# Patient Record
Sex: FEMALE | Race: WHITE | NOT HISPANIC OR LATINO
[De-identification: names, ages, dates, MRNs, and addresses within clinical notes are randomized per-mention and may not be internally consistent; named-entity substitution may affect disease eponyms.]

---

## 2022-06-29 ENCOUNTER — NON-APPOINTMENT (OUTPATIENT)
Age: 82
End: 2022-06-29

## 2022-06-30 ENCOUNTER — TRANSCRIPTION ENCOUNTER (OUTPATIENT)
Age: 82
End: 2022-06-30

## 2022-07-02 ENCOUNTER — OUTPATIENT (OUTPATIENT)
Dept: OUTPATIENT SERVICES | Facility: HOSPITAL | Age: 82
LOS: 1 days | End: 2022-07-02

## 2022-07-02 ENCOUNTER — APPOINTMENT (OUTPATIENT)
Dept: DISASTER EMERGENCY | Facility: HOSPITAL | Age: 82
End: 2022-07-02

## 2022-07-02 VITALS
RESPIRATION RATE: 18 BRPM | HEART RATE: 66 BPM | TEMPERATURE: 97 F | OXYGEN SATURATION: 96 % | SYSTOLIC BLOOD PRESSURE: 106 MMHG | DIASTOLIC BLOOD PRESSURE: 58 MMHG

## 2022-07-02 VITALS
RESPIRATION RATE: 18 BRPM | HEIGHT: 64 IN | OXYGEN SATURATION: 97 % | TEMPERATURE: 97 F | SYSTOLIC BLOOD PRESSURE: 111 MMHG | DIASTOLIC BLOOD PRESSURE: 65 MMHG | HEART RATE: 65 BPM | WEIGHT: 151.9 LBS

## 2022-07-02 DIAGNOSIS — U07.1 COVID-19: ICD-10-CM

## 2022-07-02 RX ORDER — BEBTELOVIMAB 87.5 MG/ML
175 INJECTION, SOLUTION INTRAVENOUS ONCE
Refills: 0 | Status: COMPLETED | OUTPATIENT
Start: 2022-07-02 | End: 2022-07-02

## 2022-07-02 RX ADMIN — BEBTELOVIMAB 175 MILLIGRAM(S): 87.5 INJECTION, SOLUTION INTRAVENOUS at 15:01

## 2022-07-02 NOTE — CHART NOTE - NSCHARTNOTEFT_GEN_A_CORE
CC: Monoclonal Antibody Infusion/COVID 19 Positive  81y Female with PMH of Depression, Hypotension and recent dx of COVID 19+ who presents today for elective Bebtelovimab. Patient has been screened and was deemed to be a candidate.    Symptoms/ Criteria  Date of Symptom Onset: 6/28/2022  Symptoms: cough, fatigue  Date of Positive COVID PCR: 6/30/2022  Risk Profile includes: Mental health disorder, Use of anti depression medications and risk of drug drug interaction with Paxlovid, moderate COVID symptoms,       Vaccination Status:     PMHx:  Infection due to severe acute respiratory syndrome coronavirus 2 (SARS-CoV-2)        Exam/findings:  T(C): 36.4 (07-02-22 @ 15:20), Max: 36.4 (07-02-22 @ 15:20)  HR: 62 (07-02-22 @ 15:20) (62 - 65)  BP: 94/58 (07-02-22 @ 15:20) (94/58 - 111/65)  RR: 18 (07-02-22 @ 15:20) (18 - 18)  SpO2: 98% (07-02-22 @ 15:20) (97% - 98%)    PE:   Appearance: NAD	  HEENT:  NC/AT  Cardiovascular:  No edema  Respiratory: no use of accessory muscles  Gastrointestinal:  non-distended   Skin: warm and dry  Neurologic: Non-focal  Extremities: Normal range of motion    ASSESSMENT:  Pt is COVID positive with mild to moderate symptoms who was referred for elective MAB (Bebtelovimab).    PLAN:  - MAB treatment explained to patient. I have reviewed the Bebtelovimab Emergency Use Authorization (EUA) and I have provided the patient or patient's caregiver with the following information:   1. FDA has authorized emergency use of Bebtelovimab to be administered for the treatment of mild to moderate COVID-19, which is not an FDA-approved biological product.   2. The patient or patient's caregiver has the option to accept or refuse administration of MAB.   3. The significant known and potential risks and benefits of Bebtelovimab and the extent to which such risks and benefits are unknown.  4. Information on available alternative treatments and risks and benefits of those alternatives.  - Patient verbalized understanding of plan and agrees to treatment. All questions answered.  - Consent for MAB obtained.   - 175mg of Bebtelovimab administered as a single intravenous injection over at least 30 seconds.   - Observe patient for one hour post medication administration and then if stable, discharge home with oupatient follow up as planned by PCP.      POST ASSESSMENT:   Patient completed MAB, and monitored x 1 hour post-infusion with no adverse reactions noted, remained hemodynamically stable.  - Patient tolerated infusion well; denies complaints of chest pain/SOB/dizziness/palpitations.   - VSS for discharge home.  - D/C instructions given/ fact sheet included.  - Patient was instructed to self-isolate and use infection control measures (e.g wear mask, isolate, social distance, avoid sharing personal items, clean and disinfect "high touch" surfaces, and frequent handwashing according to the CDC guidelines.   - The patient was informed on what symptoms to be aware of for the next couple of days, and if there are any issues to call the 24/7 clinical call center. Patient was instructed to follow up with primary care provider as needed.    DISCHARGE stable

## 2022-07-10 ENCOUNTER — TRANSCRIPTION ENCOUNTER (OUTPATIENT)
Age: 82
End: 2022-07-10

## 2022-07-10 ENCOUNTER — EMERGENCY (EMERGENCY)
Facility: HOSPITAL | Age: 82
LOS: 1 days | Discharge: LEFT BEFORE DISCHARGE | End: 2022-07-10
Attending: EMERGENCY MEDICINE

## 2022-07-10 ENCOUNTER — NON-APPOINTMENT (OUTPATIENT)
Age: 82
End: 2022-07-10

## 2022-07-10 VITALS
DIASTOLIC BLOOD PRESSURE: 67 MMHG | TEMPERATURE: 98 F | RESPIRATION RATE: 18 BRPM | SYSTOLIC BLOOD PRESSURE: 121 MMHG | WEIGHT: 151.9 LBS | OXYGEN SATURATION: 100 % | HEIGHT: 64 IN | HEART RATE: 77 BPM

## 2022-07-10 PROCEDURE — L9991: CPT

## 2022-07-10 NOTE — ED ADULT TRIAGE NOTE - CHIEF COMPLAINT QUOTE
S/P TRIPPED /FELL AT HOME DENIES LOC C/O LACERATION IN HER HEAD /PT A/O X 3 NOT IN DISTRESS /COVID +6/30/2022

## 2022-07-11 NOTE — ED ADULT NURSE NOTE - OBJECTIVE STATEMENT
Pt presents to the ED with c/o laceration on head s/p trip and fall at home. Denies LOC. Pt was also COVID+ on 6/30/2022.

## 2022-07-11 NOTE — ED ADULT NURSE NOTE - PAIN RATING/NUMBER SCALE (0-10): ACTIVITY
Detail Level: Simple
Instructions: This plan will send the code FBSD to the PM system.  DO NOT or CHANGE the price.
Price (Do Not Change): 0.00
7

## 2022-07-17 ENCOUNTER — INPATIENT (INPATIENT)
Facility: HOSPITAL | Age: 82
LOS: 0 days | Discharge: HOME | DRG: 563 | End: 2022-07-18
Attending: SPECIALIST | Admitting: SPECIALIST
Payer: MEDICARE

## 2022-07-17 ENCOUNTER — TRANSCRIPTION ENCOUNTER (OUTPATIENT)
Age: 82
End: 2022-07-17

## 2022-07-17 VITALS
OXYGEN SATURATION: 96 % | HEIGHT: 64 IN | TEMPERATURE: 98 F | RESPIRATION RATE: 18 BRPM | HEART RATE: 62 BPM | DIASTOLIC BLOOD PRESSURE: 64 MMHG | SYSTOLIC BLOOD PRESSURE: 100 MMHG | WEIGHT: 153 LBS

## 2022-07-17 LAB
ALBUMIN SERPL ELPH-MCNC: 4.2 G/DL — SIGNIFICANT CHANGE UP (ref 3.3–5)
ALP SERPL-CCNC: 100 U/L — SIGNIFICANT CHANGE UP (ref 40–120)
ALT FLD-CCNC: 14 U/L — SIGNIFICANT CHANGE UP (ref 10–45)
ANION GAP SERPL CALC-SCNC: 10 MMOL/L — SIGNIFICANT CHANGE UP (ref 5–17)
APTT BLD: 29.8 SEC — SIGNIFICANT CHANGE UP (ref 27.5–35.5)
AST SERPL-CCNC: 18 U/L — SIGNIFICANT CHANGE UP (ref 10–40)
BASOPHILS # BLD AUTO: 0.02 K/UL — SIGNIFICANT CHANGE UP (ref 0–0.2)
BASOPHILS NFR BLD AUTO: 0.2 % — SIGNIFICANT CHANGE UP (ref 0–2)
BILIRUB SERPL-MCNC: 0.4 MG/DL — SIGNIFICANT CHANGE UP (ref 0.2–1.2)
BLD GP AB SCN SERPL QL: NEGATIVE — SIGNIFICANT CHANGE UP
BUN SERPL-MCNC: 22 MG/DL — SIGNIFICANT CHANGE UP (ref 7–23)
CALCIUM SERPL-MCNC: 9.6 MG/DL — SIGNIFICANT CHANGE UP (ref 8.4–10.5)
CHLORIDE SERPL-SCNC: 96 MMOL/L — SIGNIFICANT CHANGE UP (ref 96–108)
CO2 SERPL-SCNC: 25 MMOL/L — SIGNIFICANT CHANGE UP (ref 22–31)
CREAT SERPL-MCNC: 0.64 MG/DL — SIGNIFICANT CHANGE UP (ref 0.5–1.3)
EGFR: 90 ML/MIN/1.73M2 — SIGNIFICANT CHANGE UP
EOSINOPHIL # BLD AUTO: 0.02 K/UL — SIGNIFICANT CHANGE UP (ref 0–0.5)
EOSINOPHIL NFR BLD AUTO: 0.2 % — SIGNIFICANT CHANGE UP (ref 0–6)
GLUCOSE SERPL-MCNC: 116 MG/DL — HIGH (ref 70–99)
HCT VFR BLD CALC: 37 % — SIGNIFICANT CHANGE UP (ref 34.5–45)
HGB BLD-MCNC: 11.9 G/DL — SIGNIFICANT CHANGE UP (ref 11.5–15.5)
IMM GRANULOCYTES NFR BLD AUTO: 0.4 % — SIGNIFICANT CHANGE UP (ref 0–1.5)
INR BLD: 1.14 RATIO — SIGNIFICANT CHANGE UP (ref 0.88–1.16)
LYMPHOCYTES # BLD AUTO: 0.91 K/UL — LOW (ref 1–3.3)
LYMPHOCYTES # BLD AUTO: 7.4 % — LOW (ref 13–44)
MCHC RBC-ENTMCNC: 30.8 PG — SIGNIFICANT CHANGE UP (ref 27–34)
MCHC RBC-ENTMCNC: 32.2 GM/DL — SIGNIFICANT CHANGE UP (ref 32–36)
MCV RBC AUTO: 95.9 FL — SIGNIFICANT CHANGE UP (ref 80–100)
MONOCYTES # BLD AUTO: 0.65 K/UL — SIGNIFICANT CHANGE UP (ref 0–0.9)
MONOCYTES NFR BLD AUTO: 5.3 % — SIGNIFICANT CHANGE UP (ref 2–14)
NEUTROPHILS # BLD AUTO: 10.6 K/UL — HIGH (ref 1.8–7.4)
NEUTROPHILS NFR BLD AUTO: 86.5 % — HIGH (ref 43–77)
NRBC # BLD: 0 /100 WBCS — SIGNIFICANT CHANGE UP (ref 0–0)
PLATELET # BLD AUTO: 247 K/UL — SIGNIFICANT CHANGE UP (ref 150–400)
POTASSIUM SERPL-MCNC: 4.5 MMOL/L — SIGNIFICANT CHANGE UP (ref 3.5–5.3)
POTASSIUM SERPL-SCNC: 4.5 MMOL/L — SIGNIFICANT CHANGE UP (ref 3.5–5.3)
PROT SERPL-MCNC: 8.1 G/DL — SIGNIFICANT CHANGE UP (ref 6–8.3)
PROTHROM AB SERPL-ACNC: 13.2 SEC — SIGNIFICANT CHANGE UP (ref 10.5–13.4)
RBC # BLD: 3.86 M/UL — SIGNIFICANT CHANGE UP (ref 3.8–5.2)
RBC # FLD: 13.5 % — SIGNIFICANT CHANGE UP (ref 10.3–14.5)
RH IG SCN BLD-IMP: POSITIVE — SIGNIFICANT CHANGE UP
SODIUM SERPL-SCNC: 131 MMOL/L — LOW (ref 135–145)
WBC # BLD: 12.25 K/UL — HIGH (ref 3.8–10.5)
WBC # FLD AUTO: 12.25 K/UL — HIGH (ref 3.8–10.5)

## 2022-07-17 PROCEDURE — 73110 X-RAY EXAM OF WRIST: CPT | Mod: 26,LT,77

## 2022-07-17 PROCEDURE — 93010 ELECTROCARDIOGRAM REPORT: CPT

## 2022-07-17 PROCEDURE — 73080 X-RAY EXAM OF ELBOW: CPT | Mod: 26,LT

## 2022-07-17 PROCEDURE — 73060 X-RAY EXAM OF HUMERUS: CPT | Mod: 26,LT

## 2022-07-17 PROCEDURE — 99291 CRITICAL CARE FIRST HOUR: CPT | Mod: 25

## 2022-07-17 PROCEDURE — 73110 X-RAY EXAM OF WRIST: CPT | Mod: 26,LT

## 2022-07-17 PROCEDURE — 73090 X-RAY EXAM OF FOREARM: CPT | Mod: 26,LT

## 2022-07-17 PROCEDURE — 71045 X-RAY EXAM CHEST 1 VIEW: CPT | Mod: 26

## 2022-07-17 RX ORDER — TETANUS TOXOID, REDUCED DIPHTHERIA TOXOID AND ACELLULAR PERTUSSIS VACCINE, ADSORBED 5; 2.5; 8; 8; 2.5 [IU]/.5ML; [IU]/.5ML; UG/.5ML; UG/.5ML; UG/.5ML
0.5 SUSPENSION INTRAMUSCULAR ONCE
Refills: 0 | Status: COMPLETED | OUTPATIENT
Start: 2022-07-17 | End: 2022-07-17

## 2022-07-17 RX ORDER — CEFAZOLIN SODIUM 1 G
2000 VIAL (EA) INJECTION ONCE
Refills: 0 | Status: COMPLETED | OUTPATIENT
Start: 2022-07-17 | End: 2022-07-17

## 2022-07-17 RX ORDER — FENTANYL CITRATE 50 UG/ML
25 INJECTION INTRAVENOUS ONCE
Refills: 0 | Status: DISCONTINUED | OUTPATIENT
Start: 2022-07-17 | End: 2022-07-17

## 2022-07-17 RX ADMIN — Medication 100 MILLIGRAM(S): at 21:47

## 2022-07-17 RX ADMIN — FENTANYL CITRATE 25 MICROGRAM(S): 50 INJECTION INTRAVENOUS at 21:47

## 2022-07-17 RX ADMIN — TETANUS TOXOID, REDUCED DIPHTHERIA TOXOID AND ACELLULAR PERTUSSIS VACCINE, ADSORBED 0.5 MILLILITER(S): 5; 2.5; 8; 8; 2.5 SUSPENSION INTRAMUSCULAR at 21:48

## 2022-07-17 NOTE — ED PROVIDER NOTE - CLINICAL SUMMARY MEDICAL DECISION MAKING FREE TEXT BOX
Joel Maynard (MD): 81y F w/ PMHx of Hypotension (on midodrine) presents w/ slip and fall on ramp landing on L wrist as FOOSH. Denies head injury, LOC, neck pain, CP, abd pain, N/V. On exam, pt w/ punctate skin opening over dorsal aspect of distal radius, deformity of distal forearm but pt is neurovascularly intact distally. Concern for open fracture of distal radius. Will obtain imaging. Will provide ABx. Likely Gustilo type 1. Will give pt tetanus and pain control. Call XR to expedite imaging. Joel Maynard (MD): 81y F w/ PMHx of Hypotension (on midodrine) presents w/ slip and fall on ramp landing on L wrist as FOOSH. Denies head injury, LOC, neck pain, CP, abd pain, N/V. On exam, pt w/ punctate skin opening over dorsal aspect of distal radius, deformity of distal forearm but pt is neurovascularly intact distally. Concern for open fracture of distal radius. Will obtain imaging. Will provide ABx. Likely Gustilo type 1. Will give pt tetanus and pain control. Called XR to expedite imaging.

## 2022-07-17 NOTE — ED ADULT TRIAGE NOTE - CHIEF COMPLAINT QUOTE
Reports lost balance, slipped and fell. C/o L wrist pain with obvious deformity and open wound at site. Pt took tylenol and tramadol ~1830

## 2022-07-17 NOTE — ED PROVIDER NOTE - CARE PLAN
1 Principal Discharge DX:	Open fracture of left distal radius  Secondary Diagnosis:	Fall from standing

## 2022-07-17 NOTE — ED PROVIDER NOTE - NS_ ATTENDINGSCRIBEDETAILS _ED_A_ED_FT
The history, relevant review of symptoms, past medical and surgical history, physical examination, and medical decision making was documented by the scribe in my presence and I attest to the accuracy of the documentation.

## 2022-07-17 NOTE — ED ADULT NURSE NOTE - OBJECTIVE STATEMENT
82yo F pmh hypotension and depression presents to ED from daughter's home with daughter at the bedside following a single witnessed mechanical fall at home. Pt reports she slipped while walking with a cane instead of her walker, fell on her L wrist. Pt denies head strike, LOC, or use of blood thinners. Pt denies dizziness prior to fall. Pt is currently endorsing pain and swelling to the L wrist. Pt denies any headache, dizziness, CP, SOB, or numbness/tingling to the affected extremity. On assessment, pt is awake, a&ox4, spontaneous respirations, obvious deformity noted to L wrist with extensive swelling and open wound noted to site with small amount of active bleeding, limited ROM noted to L wrist and fingers d/t pain, tenderness to palpation of L wrist, + peripheral pulses b/l. pending MD assessmnent.

## 2022-07-17 NOTE — ED PROVIDER NOTE - ATTENDING CONTRIBUTION TO CARE
Joel Maynard MD:   I personally saw the patient and performed a substantive portion of the visit including all aspects of the medical decision making.    See MDM Joel Maynard MD:   I personally saw the patient and performed a substantive portion of the visit including all aspects of the medical decision making.    See [facility]    Critical Care Billing: Open Fracture   Upon my evaluation, this patient had a high probability of imminent or life-threatening deterioration, which required my direct attention, intervention, and personal management.  The patient has a  medical condition that impairs one or more vital organ systems.  Frequent personal assessment and adjustment of medical interventions was performed.      I have personally provided 35 minutes of critical care time exclusive of time spent on separately billable procedures. Time includes review of laboratory data, radiology results, discussion with consultants, patient and family; monitoring for potential decompensation, as well as time spent retrieving data and reviewing the chart and documenting the visit. Interventions were performed as documented above.

## 2022-07-17 NOTE — ED PROVIDER NOTE - PROGRESS NOTE DETAILS
Jolie Castro MD PGY2: Ortho consulted. Patient with left colle's fracture. Joel Maynard MD: pt reduced and splinted by Ortho, post-reduction XR performed

## 2022-07-17 NOTE — ED PROVIDER NOTE - PHYSICAL EXAMINATION
GENERAL: no acute distress, non-toxic appearing  HEAD: normocephalic, atraumatic  HEENT: PERRLA, EOMI  CARDIAC: regular rate and rhythm  PULM: clear to ascultation bilaterally  GI: abdomen nondistended, soft, nontender  NEURO: alert and oriented x 3, normal speech, no sensory deficits, moving all digits and extremities spontaneously  MSK: left wrist swelling, open injury to left wrist with active bleeding, pulses 2+ bilaterally, ROM arm and hand limited due to pain  SKIN: no visible rashes, dry, well-perfused

## 2022-07-17 NOTE — ED PROVIDER NOTE - OBJECTIVE STATEMENT
Patient is a 81y F PMHx hypotension on midodrine presenting with wrist pain after fall. Patient states she tripped down a ramp onto outstretched left arm a couple hours ago. Took tramadol for pain, minimal improvement. Severe wrist pain, denies numbness, head trauma, LOC, CP, SOB, palpitations, neck pain, back pain, hip pain.

## 2022-07-17 NOTE — ED ADULT NURSE NOTE - NSIMPLEMENTINTERV_GEN_ALL_ED
Implemented All Fall Risk Interventions:  [place] to call system. Call bell, personal items and telephone within reach. Instruct patient to call for assistance. Room bathroom lighting operational. Non-slip footwear when patient is off stretcher. Physically safe environment: no spills, clutter or unnecessary equipment. Stretcher in lowest position, wheels locked, appropriate side rails in place. Provide visual cue, wrist band, yellow gown, etc. Monitor gait and stability. Monitor for mental status changes and reorient to person, place, and time. Review medications for side effects contributing to fall risk. Reinforce activity limits and safety measures with patient and family.

## 2022-07-18 ENCOUNTER — TRANSCRIPTION ENCOUNTER (OUTPATIENT)
Age: 82
End: 2022-07-18

## 2022-07-18 VITALS
TEMPERATURE: 98 F | HEART RATE: 79 BPM | SYSTOLIC BLOOD PRESSURE: 109 MMHG | RESPIRATION RATE: 18 BRPM | DIASTOLIC BLOOD PRESSURE: 77 MMHG | OXYGEN SATURATION: 97 %

## 2022-07-18 DIAGNOSIS — S52.502B UNSPECIFIED FRACTURE OF THE LOWER END OF LEFT RADIUS, INITIAL ENCOUNTER FOR OPEN FRACTURE TYPE I OR II: ICD-10-CM

## 2022-07-18 LAB — SARS-COV-2 RNA SPEC QL NAA+PROBE: SIGNIFICANT CHANGE UP

## 2022-07-18 PROCEDURE — 73080 X-RAY EXAM OF ELBOW: CPT

## 2022-07-18 PROCEDURE — 86900 BLOOD TYPING SEROLOGIC ABO: CPT

## 2022-07-18 PROCEDURE — 99285 EMERGENCY DEPT VISIT HI MDM: CPT | Mod: 25

## 2022-07-18 PROCEDURE — 96375 TX/PRO/DX INJ NEW DRUG ADDON: CPT

## 2022-07-18 PROCEDURE — 86850 RBC ANTIBODY SCREEN: CPT

## 2022-07-18 PROCEDURE — 97161 PT EVAL LOW COMPLEX 20 MIN: CPT

## 2022-07-18 PROCEDURE — 86901 BLOOD TYPING SEROLOGIC RH(D): CPT

## 2022-07-18 PROCEDURE — 90715 TDAP VACCINE 7 YRS/> IM: CPT

## 2022-07-18 PROCEDURE — 93005 ELECTROCARDIOGRAM TRACING: CPT

## 2022-07-18 PROCEDURE — 90471 IMMUNIZATION ADMIN: CPT

## 2022-07-18 PROCEDURE — 96374 THER/PROPH/DIAG INJ IV PUSH: CPT

## 2022-07-18 PROCEDURE — 99238 HOSP IP/OBS DSCHRG MGMT 30/<: CPT | Mod: GC

## 2022-07-18 PROCEDURE — 73090 X-RAY EXAM OF FOREARM: CPT

## 2022-07-18 PROCEDURE — 80053 COMPREHEN METABOLIC PANEL: CPT

## 2022-07-18 PROCEDURE — 73060 X-RAY EXAM OF HUMERUS: CPT

## 2022-07-18 PROCEDURE — 71045 X-RAY EXAM CHEST 1 VIEW: CPT

## 2022-07-18 PROCEDURE — 73110 X-RAY EXAM OF WRIST: CPT

## 2022-07-18 PROCEDURE — 85025 COMPLETE CBC W/AUTO DIFF WBC: CPT

## 2022-07-18 PROCEDURE — U0005: CPT

## 2022-07-18 PROCEDURE — 85610 PROTHROMBIN TIME: CPT

## 2022-07-18 PROCEDURE — U0003: CPT

## 2022-07-18 PROCEDURE — 85730 THROMBOPLASTIN TIME PARTIAL: CPT

## 2022-07-18 PROCEDURE — 36415 COLL VENOUS BLD VENIPUNCTURE: CPT

## 2022-07-18 RX ORDER — LANOLIN ALCOHOL/MO/W.PET/CERES
3 CREAM (GRAM) TOPICAL AT BEDTIME
Refills: 0 | Status: DISCONTINUED | OUTPATIENT
Start: 2022-07-18 | End: 2022-07-18

## 2022-07-18 RX ORDER — MIDODRINE HYDROCHLORIDE 2.5 MG/1
10 TABLET ORAL THREE TIMES A DAY
Refills: 0 | Status: DISCONTINUED | OUTPATIENT
Start: 2022-07-18 | End: 2022-07-18

## 2022-07-18 RX ORDER — SENNA PLUS 8.6 MG/1
2 TABLET ORAL AT BEDTIME
Refills: 0 | Status: DISCONTINUED | OUTPATIENT
Start: 2022-07-18 | End: 2022-07-18

## 2022-07-18 RX ORDER — MAGNESIUM HYDROXIDE 400 MG/1
30 TABLET, CHEWABLE ORAL DAILY
Refills: 0 | Status: DISCONTINUED | OUTPATIENT
Start: 2022-07-18 | End: 2022-07-18

## 2022-07-18 RX ORDER — LEVOTHYROXINE SODIUM 125 MCG
112 TABLET ORAL DAILY
Refills: 0 | Status: DISCONTINUED | OUTPATIENT
Start: 2022-07-18 | End: 2022-07-18

## 2022-07-18 RX ORDER — OXYCODONE HYDROCHLORIDE 5 MG/1
1 TABLET ORAL
Qty: 28 | Refills: 0
Start: 2022-07-18 | End: 2022-07-24

## 2022-07-18 RX ORDER — SERTRALINE 25 MG/1
150 TABLET, FILM COATED ORAL DAILY
Refills: 0 | Status: DISCONTINUED | OUTPATIENT
Start: 2022-07-18 | End: 2022-07-18

## 2022-07-18 RX ORDER — SODIUM CHLORIDE 9 MG/ML
1000 INJECTION INTRAMUSCULAR; INTRAVENOUS; SUBCUTANEOUS
Refills: 0 | Status: DISCONTINUED | OUTPATIENT
Start: 2022-07-18 | End: 2022-07-18

## 2022-07-18 RX ORDER — OXYCODONE HYDROCHLORIDE 5 MG/1
5 TABLET ORAL EVERY 4 HOURS
Refills: 0 | Status: DISCONTINUED | OUTPATIENT
Start: 2022-07-18 | End: 2022-07-18

## 2022-07-18 RX ORDER — BUPROPION HYDROCHLORIDE 150 MG/1
300 TABLET, EXTENDED RELEASE ORAL DAILY
Refills: 0 | Status: DISCONTINUED | OUTPATIENT
Start: 2022-07-18 | End: 2022-07-18

## 2022-07-18 RX ORDER — HYDROMORPHONE HYDROCHLORIDE 2 MG/ML
0.5 INJECTION INTRAMUSCULAR; INTRAVENOUS; SUBCUTANEOUS EVERY 6 HOURS
Refills: 0 | Status: DISCONTINUED | OUTPATIENT
Start: 2022-07-18 | End: 2022-07-18

## 2022-07-18 RX ORDER — OXYCODONE HYDROCHLORIDE 5 MG/1
2.5 TABLET ORAL EVERY 4 HOURS
Refills: 0 | Status: DISCONTINUED | OUTPATIENT
Start: 2022-07-18 | End: 2022-07-18

## 2022-07-18 RX ADMIN — OXYCODONE HYDROCHLORIDE 2.5 MILLIGRAM(S): 5 TABLET ORAL at 01:50

## 2022-07-18 RX ADMIN — MIDODRINE HYDROCHLORIDE 10 MILLIGRAM(S): 2.5 TABLET ORAL at 06:04

## 2022-07-18 RX ADMIN — OXYCODONE HYDROCHLORIDE 2.5 MILLIGRAM(S): 5 TABLET ORAL at 02:38

## 2022-07-18 RX ADMIN — MIDODRINE HYDROCHLORIDE 10 MILLIGRAM(S): 2.5 TABLET ORAL at 17:14

## 2022-07-18 RX ADMIN — HYDROMORPHONE HYDROCHLORIDE 0.5 MILLIGRAM(S): 2 INJECTION INTRAMUSCULAR; INTRAVENOUS; SUBCUTANEOUS at 03:55

## 2022-07-18 RX ADMIN — BUPROPION HYDROCHLORIDE 300 MILLIGRAM(S): 150 TABLET, EXTENDED RELEASE ORAL at 11:24

## 2022-07-18 RX ADMIN — Medication 112 MICROGRAM(S): at 06:04

## 2022-07-18 RX ADMIN — HYDROMORPHONE HYDROCHLORIDE 0.5 MILLIGRAM(S): 2 INJECTION INTRAMUSCULAR; INTRAVENOUS; SUBCUTANEOUS at 08:32

## 2022-07-18 RX ADMIN — HYDROMORPHONE HYDROCHLORIDE 0.5 MILLIGRAM(S): 2 INJECTION INTRAMUSCULAR; INTRAVENOUS; SUBCUTANEOUS at 02:52

## 2022-07-18 RX ADMIN — SODIUM CHLORIDE 125 MILLILITER(S): 9 INJECTION INTRAMUSCULAR; INTRAVENOUS; SUBCUTANEOUS at 01:46

## 2022-07-18 RX ADMIN — OXYCODONE HYDROCHLORIDE 5 MILLIGRAM(S): 5 TABLET ORAL at 07:34

## 2022-07-18 RX ADMIN — OXYCODONE HYDROCHLORIDE 5 MILLIGRAM(S): 5 TABLET ORAL at 13:20

## 2022-07-18 RX ADMIN — OXYCODONE HYDROCHLORIDE 5 MILLIGRAM(S): 5 TABLET ORAL at 08:17

## 2022-07-18 RX ADMIN — HYDROMORPHONE HYDROCHLORIDE 0.5 MILLIGRAM(S): 2 INJECTION INTRAMUSCULAR; INTRAVENOUS; SUBCUTANEOUS at 09:00

## 2022-07-18 RX ADMIN — SERTRALINE 150 MILLIGRAM(S): 25 TABLET, FILM COATED ORAL at 12:18

## 2022-07-18 RX ADMIN — HYDROMORPHONE HYDROCHLORIDE 0.5 MILLIGRAM(S): 2 INJECTION INTRAMUSCULAR; INTRAVENOUS; SUBCUTANEOUS at 14:32

## 2022-07-18 RX ADMIN — HYDROMORPHONE HYDROCHLORIDE 0.5 MILLIGRAM(S): 2 INJECTION INTRAMUSCULAR; INTRAVENOUS; SUBCUTANEOUS at 14:55

## 2022-07-18 RX ADMIN — OXYCODONE HYDROCHLORIDE 5 MILLIGRAM(S): 5 TABLET ORAL at 12:42

## 2022-07-18 RX ADMIN — MIDODRINE HYDROCHLORIDE 10 MILLIGRAM(S): 2.5 TABLET ORAL at 11:22

## 2022-07-18 NOTE — PATIENT PROFILE ADULT - FALL HARM RISK - HARM RISK INTERVENTIONS

## 2022-07-18 NOTE — DISCHARGE NOTE PROVIDER - NSDCMRMEDTOKEN_GEN_ALL_CORE_FT
cefadroxil 500 mg oral capsule: 1 cap(s) orally 2 times a day   Gelnique 10% transdermal gel: 1 packet(s) transdermal once a day  midodrine 10 mg oral tablet: 1 tab(s) orally 3 times a day  oxyCODONE 5 mg oral tablet: 1 tab(s) orally every 6 hours, As Needed MDD:4  Rolling Walker: Rolling Walker  To be used for ambulation  ICD10: R26.2  Dx: Inability to ambulate  Synthroid 112 mcg (0.112 mg) oral tablet: 1 tab(s) orally once a day  Wellbutrin  mg/24 hours oral tablet, extended release: 1 tab(s) orally every 24 hours  Zoloft: 150 milligram(s) orally once a day   cefadroxil 500 mg oral capsule: 1 cap(s) orally 2 times a day   Gelnique 10% transdermal gel: 1 packet(s) transdermal once a day  midodrine 10 mg oral tablet: 1 tab(s) orally 3 times a day  oxyCODONE 5 mg oral tablet: 1 tab(s) orally every 6 hours, As Needed MDD:4  Rolling Walker: once a day   Synthroid 112 mcg (0.112 mg) oral tablet: 1 tab(s) orally once a day  Wellbutrin  mg/24 hours oral tablet, extended release: 1 tab(s) orally every 24 hours  Zoloft: 150 milligram(s) orally once a day

## 2022-07-18 NOTE — DISCHARGE NOTE NURSING/CASE MANAGEMENT/SOCIAL WORK - NSDCPEFALRISK_GEN_ALL_CORE
For information on Fall & Injury Prevention, visit: [email or web address] OR  [email or web address] OR  https://www.cdc.gov/steadi/patient.html

## 2022-07-18 NOTE — DISCHARGE NOTE PROVIDER - NSDCFUADDINST_GEN_ALL_CORE_FT
1. Pain medication has been sent to your pharmacy - pick it up on the way home and use as needed.   2. Non-Weight Bearing left upper extremity with assistive devices (walker/cane) as needed  3. Follow up with Dr. Osborne either today or tomorrow, 7/18 or 7/19.   4. Ice/Elevate affected area as needed.  5. Keep dressing/splint/cast clean and dry.

## 2022-07-18 NOTE — PHYSICAL THERAPY INITIAL EVALUATION ADULT - PERTINENT HX OF CURRENT PROBLEM, REHAB EVAL
81 y.o F presents to ED s/p mechanical fall w/ severe left wrist pain. Pt denies headstrike or LOC. Ambulates with cane at baseline. Pt noticed immediate pain & inability to range the wrist. . In the ED, endorses pain and swelling over the wrist and pain with range of motion. Pt denies any fevers, chills, numbness, tingling, weakness, any other orthopaedic complaint. Pt is visiting her daughter from [place].X-ray: L wrist and forearm w/ distal bone forearm fracture, volar displacement of carpus.

## 2022-07-18 NOTE — PHYSICAL THERAPY INITIAL EVALUATION ADULT - ACTIVE RANGE OF MOTION EXAMINATION, REHAB EVAL
LUE limited secondary to sling/Right UE Active ROM was WFL (within functional limits)/bilateral  lower extremity Active ROM was WFL (within functional limits)

## 2022-07-18 NOTE — H&P ADULT - HISTORY OF PRESENT ILLNESS
81y Female RHD presents to ED after mechanical fall with severe left wrist pain. Patient denies headstrike or LOC. Ambulates with cane at baseline. Patient noticed immediate pain and inability to range the wrist. Patient subsequently came to the ED for further evaluation and management. In the ED, endorses pain and swelling over the wrist and pain with range of motion. Patient denies any fevers, chills, numbness, tingling, weakness, or any other orthopaedic complaint. Patient is visiting her daughter from [place], where she normally resides and gets the majority of her medical care.

## 2022-07-18 NOTE — H&P ADULT - ASSESSMENT
Procedure Note:  Risks and benefits for the procedure were explained to the patient. An injection was offered to the patient for analgesia prior to procedure. The patient expressed understanding and agreed with the plan. The patient gave verbal consent for the injection and procedure. The skin was prepped in sterile fashion with alcohol scrub. The fracture site was then injected with 10mL 1% lidocaine without epinephrine. Time was allowed for anesthetic effect to occur. Once the patient was comfortable, the extremity was generally cleaned. The fracture was then manipulated/closed reduced. The extremity was wrapped with Webril, with care to pad the bony prominences over the olecranon and medial and lateral epicondyles. A plaster splint was applied; wrapped with Webril and an ace wrap; and molded until hardened. Care was taken to avoid sharp edges and to protect the skin. The extremity was elevated on pillows and ice was applied. Neurovascular exam was unchanged after the procedure. Post reduction films were ordered and demonstrated adequate reduction of the fracture.    Assessment and Plan  81y Female with Gustilo Dinh 1 open distal both bone forearm fracture    Imaging findings reviewed and discussed with the patient. Need for operative intervention discussed.   Fracture reduced and splinted per above procedure note.   NWB L UE in sugar tong splint  Pain control PRN  Plan for OR 7/18 AM for irrigation, debridement, ORIF of the left wrist. Please document medical clearance.   Continue Ancef 2000mg q8h, Tdap   Will discuss with Dr. Osborne and advise of any changes to the plan.

## 2022-07-18 NOTE — DISCHARGE NOTE PROVIDER - CARE PROVIDER_API CALL
Luke Osborne)  Orthopaedic Surgery  [address]  [place] [address]  Phone: [phone]  Fax: [phone]  Follow Up Time:

## 2022-07-18 NOTE — DISCHARGE NOTE PROVIDER - NSDCCPCAREPLAN_GEN_ALL_CORE_FT
PRINCIPAL DISCHARGE DIAGNOSIS  Diagnosis: Open fracture of left distal radius  Assessment and Plan of Treatment:       SECONDARY DISCHARGE DIAGNOSES  Diagnosis: Fall from standing  Assessment and Plan of Treatment:

## 2022-07-18 NOTE — H&P ADULT - NSHPPHYSICALEXAM_GEN_ALL_CORE
Physical Exam  Vital Signs Last 24 Hrs  T(C): 36.7 (07-17-22 @ 21:56), Max: 36.7 (07-17-22 @ 19:26)  T(F): 98.1 (07-17-22 @ 21:56), Max: 98.1 (07-17-22 @ 21:56)  HR: 66 (07-17-22 @ 21:56) (62 - 72)  BP: 106/62 (07-17-22 @ 21:56) (100/64 - 110/58)  BP(mean): 75 (07-17-22 @ 19:45) (75 - 75)  RR: 16 (07-17-22 @ 21:56) (16 - 18)  SpO2: 97% (07-17-22 @ 21:56) (96% - 97%)    Gen: Resting in bed, NAD  L UE:   Notable edema and deformity over the wrist/distal forearm. Small pokehole visualized at the dorsal surface of distal forearm with oozing hematoma.   TTP over deformity, decreased and painful ROM of the wrist; otherwise, NTTP throughout the rest of the extremity.  SILT C5-T1.    Ax/rad/msc/med/uln/ain/pin intact.   Radial pulse palpable.   No calf tenderness bilaterally.   Compartments soft and compressible.    Secondary Assessment:  NC/AT, NTTP of clavicles, NTTP of C-spine,T-spine, or L-spine in the midline and paraspinal areas; NTTP of pelvis  RUE: NTTP of Shoulder, Elbow, Wrist, Hand; NT with AROM/PROM of Shoulder, Elbow, Wrist, Hand; AIN/PIN/Med/Uln/Msc/Rad/Ax intact   LLE: Able to SLR, NT with Log Roll, NT with Heel Strike, NTTP of Hip, Knee, Ankle, Foot; NT with AROM/PROM of Hip, Knee, Ankle, Foot; Q/H/GSC/TA/EHL/FHL intact  RLE: Able to SLR, NT with Log Roll, NT with Heel Strike, NTTP of Hip, Knee, Ankle, Foot; NT with AROM/PROM of Hip, Knee, Ankle, Foot; Q/H/GSC/TA/EHL/FHL intact

## 2022-07-18 NOTE — DISCHARGE NOTE NURSING/CASE MANAGEMENT/SOCIAL WORK - NSDCVIVACCINE_GEN_ALL_CORE_FT
Tdap; 17-Jul-2022 21:48; Sahil Lott (SARAHY); [employer]; [id number] (Exp. Date: 18-Apr-2024); IntraMuscular; Deltoid Right.; 0.5 milliLiter(s); VIS (VIS Published: 09-May-2013, VIS Presented: 17-Jul-2022);

## 2022-07-18 NOTE — DISCHARGE NOTE NURSING/CASE MANAGEMENT/SOCIAL WORK - PATIENT PORTAL LINK FT
You can access the FollowMyHealth Patient Portal offered by [facility] by registering at the following website: [email or web address]. By joining [employer]’s FollowMyHealth portal, you will also be able to view your health information using other applications (apps) compatible with our system.

## 2022-07-18 NOTE — PHYSICAL THERAPY INITIAL EVALUATION ADULT - ADDITIONAL COMMENTS
Patient is visiting from [place]. She is staying with her daughter in an elevator building. At baseline she ambulates with a straight cane and reports frequent falls secondary to loss of balnce

## 2022-07-18 NOTE — H&P ADULT - NSHPLABSRESULTS_GEN_ALL_CORE
11.9   12.25 )-----------( 247      ( 17 Jul 2022 21:57 )             37.0     17 Jul 2022 21:57    131    |  96     |  22     ----------------------------<  116    4.5     |  25     |  0.64     Ca    9.6        17 Jul 2022 21:57    TPro  8.1    /  Alb  4.2    /  TBili  0.4    /  DBili  x      /  AST  18     /  ALT  14     /  AlkPhos  100    17 Jul 2022 21:57    PT/INR - ( 17 Jul 2022 21:57 )   PT: 13.2 sec;   INR: 1.14 ratio         PTT - ( 17 Jul 2022 21:57 )  PTT:29.8 sec        Imaging: Xray, 3 views of L wrist and forearm reviewed with distal both bone forearm fracture, volar displacement of carpus.

## 2022-07-19 ENCOUNTER — APPOINTMENT (OUTPATIENT)
Dept: ORTHOPEDIC SURGERY | Facility: CLINIC | Age: 82
End: 2022-07-19

## 2022-07-19 VITALS — WEIGHT: 155 LBS | HEIGHT: 64 IN | BODY MASS INDEX: 26.46 KG/M2

## 2022-07-19 DIAGNOSIS — S52.602A UNSPECIFIED FRACTURE OF LOWER END OF LEFT ULNA, INITIAL ENCOUNTER FOR CLOSED FRACTURE: ICD-10-CM

## 2022-07-19 PROBLEM — I95.9 HYPOTENSION, UNSPECIFIED: Chronic | Status: ACTIVE | Noted: 2022-07-17

## 2022-07-19 PROCEDURE — 73110 X-RAY EXAM OF WRIST: CPT | Mod: LT

## 2022-07-19 PROCEDURE — 99203 OFFICE O/P NEW LOW 30 MIN: CPT | Mod: 25

## 2022-07-19 PROCEDURE — 29125 APPL SHORT ARM SPLINT STATIC: CPT | Mod: LT

## 2022-07-19 NOTE — ADDENDUM
[FreeTextEntry1] : I, Marguerite Newell wrote this note acting as a scribe for Dr. Luke Osborne on Jul 19, 2022.

## 2022-07-19 NOTE — DISCUSSION/SUMMARY
[de-identified] : The underlying pathophysiology was reviewed with the patient. XR films were reviewed with the patient. Discussed at length the nature of the patient’s condition. The left wrist symptoms appear secondary to distal radius and ulna fractures.\par \par At this time, treatment options of operative versus nonoperative management were discussed. Given the displacement and comminution of the fracture I have recommended ORIF. I told her that ultimately she will have much better function of the hand with ORIF, especially given her activity level. \par \par The patient wishes to proceed with ORIF of the left distal radius and ulna at this time. The risks and benefits were reviewed with the patient. All of her questions were answered. She will meet with our surgical scheduler.\par \par In the interim, she was placed into a well molded fiber glass splint for external support and protection. She was instructed on use of ice, elevation and pumping of the digits to reduce edema. I advised use of NSAIDs and Tylenol as needed for pain.

## 2022-07-19 NOTE — PHYSICAL EXAM
[de-identified] : Patient is WDWN, alert, and in no acute distress. Breathing is unlabored. She is grossly oriented to person, place, and time.\par \par She is accompanied by her son today.\par \par Left Wrist:\par She presents immobilized in a sugar tong splint, which was removed for physical exam.\par There is a radial deviated deformity noted to the wrist/hand secondary to the injury.\par There is an open wound noted dorsally to the wrist.\par She has gross edema through the dorsum of the hand and to the digits.\par There is gross edema dorsally through the wrist.\par ROM to the wrist not accessed given injury and pain.\par ROM to the digits is limited secondary to edema.\par Sensation is intact to the digits distally along the median, radial and ulnar nerve distributions. [de-identified] : AP, lateral and oblique views of the LEFT wrist were obtained today (in the sugar tong splint) and revealed a comminuted intra-articular fracture of the distal radius displacement and a neutral tilt of the radius seen on the lateral view. Also a comminuted displaced fracture of the distal ulna. \par \par ------------------------------------------------------------------------------------------------------------------------------------------------------------------------------------\par \par EXAM: XR WRIST COMP MIN 3 VIEWS LT\par PROCEDURE DATE: 07/17/2022\par IMPRESSION:\par Overlying splint/cast obscures osseous and soft tissue detail.\par \par Comminuted, intra-articular fracture of the distal radius is in improved alignment with residual shortening at the fracture site and radial/volar/dorsal displacement of fracture fragments. There is neutral tilt of the radius on the lateral view.\par \par Comminuted displaced fracture of the distal ulna, which is in improved alignment with residual mild radial and dorsal displacement of the distal fracture fragments with mild shortening.\par \par WINNIE JARA MD; Resident Radiology\par This document has been electronically signed.\par LEVI HERRERA M.D., ATTENDING RADIOLOGIST Moderate Variability

## 2022-07-19 NOTE — HISTORY OF PRESENT ILLNESS
[de-identified] : Patient is a RHD 81 year old female who presents with complaints of left wrist pain secondary to an injury sustained on 7/17/22. She reports to have been been walking down an incline when she tripped and fell. She denies any loss of consciousness. She was seen at [facility] ED on 7/17/22 where xrays were obtained and revealed distal radius and ulna fractures. She was closed reduced and splinted. She sustained an open wound secondary to the injury which she was told there was bone marrow leaking out of. She is placed on antibiotics as a result. \par \par She has a history of osteoporosis. She takes Prolia as about 2 months ago. Prior to that, she was on a different medication.

## 2022-07-19 NOTE — CHART NOTE - NSCHARTNOTEFT_GEN_A_CORE
Due to the patients deconditioning and generalized weakness, along with a right distal radius fracture limiting ability to bear weight on the right UE, Patient will require a transport wheelchair for use for ambulation > 200ft.  This is necessary to achieve daily tasks and therapies which cannot be achieved with the use of a cane or rolling walker. Patient and family are in agreement with wheelchair use at home and assistance will be provided if needed.

## 2022-07-19 NOTE — END OF VISIT
[FreeTextEntry3] : All medical record entries made by the Scribe were at my,  Dr. Luke Osborne MD., direction and personally dictated by me on 07/19/2022. I have personally reviewed the chart and agree that the record accurately reflects my personal performance of the history, physical exam, assessment and plan.

## 2022-07-20 ENCOUNTER — OUTPATIENT (OUTPATIENT)
Dept: OUTPATIENT SERVICES | Facility: HOSPITAL | Age: 82
LOS: 1 days | End: 2022-07-20
Payer: MEDICARE

## 2022-07-20 VITALS
TEMPERATURE: 98 F | DIASTOLIC BLOOD PRESSURE: 68 MMHG | HEART RATE: 61 BPM | OXYGEN SATURATION: 96 % | SYSTOLIC BLOOD PRESSURE: 102 MMHG | HEIGHT: 64 IN | RESPIRATION RATE: 14 BRPM | WEIGHT: 153 LBS

## 2022-07-20 DIAGNOSIS — Z98.49 CATARACT EXTRACTION STATUS, UNSPECIFIED EYE: Chronic | ICD-10-CM

## 2022-07-20 DIAGNOSIS — S52.502A UNSPECIFIED FRACTURE OF THE LOWER END OF LEFT RADIUS, INITIAL ENCOUNTER FOR CLOSED FRACTURE: ICD-10-CM

## 2022-07-20 DIAGNOSIS — Z98.890 OTHER SPECIFIED POSTPROCEDURAL STATES: Chronic | ICD-10-CM

## 2022-07-20 DIAGNOSIS — Z90.49 ACQUIRED ABSENCE OF OTHER SPECIFIED PARTS OF DIGESTIVE TRACT: Chronic | ICD-10-CM

## 2022-07-20 DIAGNOSIS — Z01.818 ENCOUNTER FOR OTHER PREPROCEDURAL EXAMINATION: ICD-10-CM

## 2022-07-20 DIAGNOSIS — Z90.89 ACQUIRED ABSENCE OF OTHER ORGANS: Chronic | ICD-10-CM

## 2022-07-20 DIAGNOSIS — S52.602A UNSPECIFIED FRACTURE OF LOWER END OF LEFT ULNA, INITIAL ENCOUNTER FOR CLOSED FRACTURE: ICD-10-CM

## 2022-07-20 DIAGNOSIS — Z96.642 PRESENCE OF LEFT ARTIFICIAL HIP JOINT: Chronic | ICD-10-CM

## 2022-07-20 DIAGNOSIS — Z96.652 PRESENCE OF LEFT ARTIFICIAL KNEE JOINT: Chronic | ICD-10-CM

## 2022-07-20 LAB
HCT VFR BLD CALC: 35.3 % — SIGNIFICANT CHANGE UP (ref 34.5–45)
HGB BLD-MCNC: 11.1 G/DL — LOW (ref 11.5–15.5)
MCHC RBC-ENTMCNC: 30.8 PG — SIGNIFICANT CHANGE UP (ref 27–34)
MCHC RBC-ENTMCNC: 31.4 GM/DL — LOW (ref 32–36)
MCV RBC AUTO: 98.1 FL — SIGNIFICANT CHANGE UP (ref 80–100)
NRBC # BLD: 0 /100 WBCS — SIGNIFICANT CHANGE UP (ref 0–0)
PLATELET # BLD AUTO: 241 K/UL — SIGNIFICANT CHANGE UP (ref 150–400)
RBC # BLD: 3.6 M/UL — LOW (ref 3.8–5.2)
RBC # FLD: 13.7 % — SIGNIFICANT CHANGE UP (ref 10.3–14.5)
SODIUM SERPL-SCNC: 136 MMOL/L — SIGNIFICANT CHANGE UP (ref 135–145)
WBC # BLD: 8.5 K/UL — SIGNIFICANT CHANGE UP (ref 3.8–10.5)
WBC # FLD AUTO: 8.5 K/UL — SIGNIFICANT CHANGE UP (ref 3.8–10.5)

## 2022-07-20 PROCEDURE — G0463: CPT

## 2022-07-20 PROCEDURE — 36415 COLL VENOUS BLD VENIPUNCTURE: CPT

## 2022-07-20 PROCEDURE — 84295 ASSAY OF SERUM SODIUM: CPT

## 2022-07-20 PROCEDURE — 85027 COMPLETE CBC AUTOMATED: CPT

## 2022-07-20 NOTE — H&P PST ADULT - GASTROINTESTINAL
negative normal/soft/nontender/nondistended/normal active bowel sounds/no organomegaly/no masses palpable

## 2022-07-20 NOTE — H&P PST ADULT - HISTORY OF PRESENT ILLNESS
80 yo female presents after a fall in the lobby of a hotel on 7/17/22 resulting in a left distal radius fracture. She reports 2 open areas on the skin and was started on antibiotics and was given a tetanus shot at [facility]. She is currently splinted with an ace wrap and sling. Reports 4/10 pain at rest which is increased to 8/10 with any movement of the arm. Pain is mildly relieved with alternating tylenol, dual action advil and oxycodone

## 2022-07-20 NOTE — H&P PST ADULT - NSANTHOSAYNRD_GEN_A_CORE
neck/No. XIMENA screening performed.  STOP BANG Legend: 0-2 = LOW Risk; 3-4 = INTERMEDIATE Risk; 5-8 = HIGH Risk neck 13 inches/No. XIMENA screening performed.  STOP BANG Legend: 0-2 = LOW Risk; 3-4 = INTERMEDIATE Risk; 5-8 = HIGH Risk

## 2022-07-20 NOTE — H&P PST ADULT - MUSCULOSKELETAL
details… left wrist/no joint erythema/no joint warmth/no calf tenderness/decreased ROM/decreased ROM due to pain/joint swelling/decreased strength

## 2022-07-20 NOTE — H&P PST ADULT - FALL HARM RISK - HARM RISK INTERVENTIONS
Assistance with ambulation/Assistance OOB with selected safe patient handling equipment/Communicate Risk of Fall with Harm to all staff/Discuss with provider need for PT consult/Monitor gait and stability/Provide patient with walking aids - walker, cane, crutches/Reinforce activity limits and safety measures with patient and family/Sit up slowly, dangle for a short time, stand at bedside before walking/Tailored Fall Risk Interventions/Use of alarms - bed, chair and/or voice tab/Visual Cue: Yellow wristband and red socks/Bed in lowest position, wheels locked, appropriate side rails in place/Call bell, personal items and telephone in reach/Instruct patient to call for assistance before getting out of bed or chair/Non-slip footwear when patient is out of bed/Index to call system/Physically safe environment - no spills, clutter or unnecessary equipment/Purposeful Proactive Rounding/Room/bathroom lighting operational, light cord in reach

## 2022-07-20 NOTE — H&P PST ADULT - HAS THE PATIENT HAD A SIGNIFICANT CHANGE IN FUNCTIONAL STATUS DUE TO CVA, HEAD TRAUMA, ORTHOPEDIC TRAUMA/SURGERY, OR FALL, WITH THE WEEK PRIOR TO ADMISSION
Portia Sanchez  OBSTETRICS AND GYNECOLOGY  [address], [address]  [place] [address]  Phone: [phone]  Fax: [phone]  Follow Up Time:   
no

## 2022-07-20 NOTE — H&P PST ADULT - TEMPERATURE IN CELSIUS (DEGREES C)
Department of Anesthesiology  Postprocedure Note    Patient: Andrade Rosales  MRN: [id number]  YOB: 1942  Date of evaluation: 6/30/2021  Time:  2:11 PM     Procedure Summary     Date: 06/30/21 Room / Location: [address] [place] [address]    Anesthesia Start: 9485 Anesthesia Stop: 1674    Procedures:       ENDOSCOPIC ULTRASOUND OF PANCREAS (N/A )      EGD BIOPSY Diagnosis:       Pancreatic cyst      (PANCREATIC CYST)    Surgeons: Steffen Gibbons MD Responsible Provider: Franklyn Encinas MD    Anesthesia Type: MAC ASA Status: 3          Anesthesia Type: MAC    Ton Phase I: Ton Score: 7    Ton Phase II:      Last vitals: Reviewed and per EMR flowsheets.        Anesthesia Post Evaluation    Patient location during evaluation: PACU  Patient participation: complete - patient participated  Level of consciousness: awake and alert  Pain score: 1  Airway patency: patent  Nausea & Vomiting: no nausea and no vomiting  Complications: no  Cardiovascular status: blood pressure returned to baseline  Respiratory status: acceptable  Hydration status: euvolemic
36.4

## 2022-07-20 NOTE — H&P PST ADULT - PROBLEM SELECTOR PLAN 1
ORIF left distal radius and ulna. medical clearance requested. repeat CBC and sodium level. covid PCR not required, positive PCR on chart.

## 2022-07-20 NOTE — H&P PST ADULT - NSICDXPASTSURGICALHX_GEN_ALL_CORE_FT
PAST SURGICAL HISTORY:  H/O blepharoplasty age 67 and age 77    H/O cataract extraction bilateral 2017    History of colon resection perforated diverticulum, had a colostomy for 1 year and then reversed    History of cosmetic surgery "facelift" age 52 with chin and cheek implants    History of tonsillectomy and adenoidectomy as a child    Status post Mohs surgery for basal cell carcinoma      PAST SURGICAL HISTORY:  H/O blepharoplasty age 67 and age 77    H/O cataract extraction bilateral 2017    History of colon resection perforated diverticulum, had a colostomy for 1 year and then reversed    History of cosmetic surgery "facelift" age 52 with chin and cheek implants    History of left hip replacement 2019    History of left knee replacement 2015    History of open reduction and internal fixation (ORIF) procedure 2014 left femur    History of tonsillectomy and adenoidectomy as a child    Status post Mohs surgery for basal cell carcinoma

## 2022-07-20 NOTE — H&P PST ADULT - NSICDXPASTMEDICALHX_GEN_ALL_CORE_FT
PAST MEDICAL HISTORY:  Anxiety     Balance disorder     Basal cell carcinoma     COVID-19 June 2021 only had mild cold-like symptoms and 6/30/22 treated with monoclonal antibodies, cold like symptoms, no hospitalization    COVID-19 vaccine series completed     Distal radius fracture, left 7/17/22    History of dehydration     History of depression     History of diverticulitis     Hypotension     Hypothyroid     Keloid scar     Microscopic hematuria     Multiple falls     Osteoarthritis     Osteoporosis     Overactive bladder     Perforated diverticulum     Urge incontinence     Waldenstrom's disease diagnosed 1996, no treatment     PAST MEDICAL HISTORY:  Anxiety     Balance disorder     Basal cell carcinoma     COVID-19 June 2021 only had mild cold-like symptoms and 6/30/22 treated with monoclonal antibodies, cold like symptoms, no hospitalization    COVID-19 vaccine series completed     Distal radius fracture, left 7/17/22    Easy bruising     History of dehydration     History of depression     History of diverticulitis     Hyperlipidemia     Hypotension     Hypothyroid     Keloid scar     Microscopic hematuria     Multiple falls     Osteoarthritis     Osteoporosis     Overactive bladder     Perforated diverticulum     Urge incontinence     Waldenstrom's disease diagnosed 1996, no treatment

## 2022-07-20 NOTE — H&P PST ADULT - NSICDXFAMILYHX_GEN_ALL_CORE_FT
FAMILY HISTORY:  Father  Still living? No  FHx: coronary artery disease, Age at diagnosis: Age Unknown    Mother  Still living? No  Family history of glaucoma, Age at diagnosis: Age Unknown  Family history of osteoarthritis, Age at diagnosis: Age Unknown    Sibling  Still living? Yes, Estimated age: 61-70  Family history of osteoarthritis, Age at diagnosis: Age Unknown  Family history of osteoarthritis, Age at diagnosis: Age Unknown    Child  Still living? Yes, Estimated age: 51-60  Family history of bipolar disorder, Age at diagnosis: Age Unknown

## 2022-07-21 ENCOUNTER — TRANSCRIPTION ENCOUNTER (OUTPATIENT)
Age: 82
End: 2022-07-21

## 2022-07-22 ENCOUNTER — TRANSCRIPTION ENCOUNTER (OUTPATIENT)
Age: 82
End: 2022-07-22

## 2022-07-22 ENCOUNTER — OUTPATIENT (OUTPATIENT)
Dept: OUTPATIENT SERVICES | Facility: HOSPITAL | Age: 82
LOS: 1 days | End: 2022-07-22
Payer: MEDICARE

## 2022-07-22 ENCOUNTER — APPOINTMENT (OUTPATIENT)
Dept: ORTHOPEDIC SURGERY | Facility: HOSPITAL | Age: 82
End: 2022-07-22

## 2022-07-22 VITALS
WEIGHT: 148.37 LBS | RESPIRATION RATE: 19 BRPM | TEMPERATURE: 98 F | SYSTOLIC BLOOD PRESSURE: 147 MMHG | HEART RATE: 56 BPM | HEIGHT: 64 IN | DIASTOLIC BLOOD PRESSURE: 59 MMHG | OXYGEN SATURATION: 100 %

## 2022-07-22 VITALS
TEMPERATURE: 98 F | HEART RATE: 62 BPM | OXYGEN SATURATION: 97 % | DIASTOLIC BLOOD PRESSURE: 47 MMHG | SYSTOLIC BLOOD PRESSURE: 104 MMHG | RESPIRATION RATE: 16 BRPM

## 2022-07-22 DIAGNOSIS — Z98.890 OTHER SPECIFIED POSTPROCEDURAL STATES: Chronic | ICD-10-CM

## 2022-07-22 DIAGNOSIS — Z90.89 ACQUIRED ABSENCE OF OTHER ORGANS: Chronic | ICD-10-CM

## 2022-07-22 DIAGNOSIS — Z98.49 CATARACT EXTRACTION STATUS, UNSPECIFIED EYE: Chronic | ICD-10-CM

## 2022-07-22 DIAGNOSIS — S52.502A UNSPECIFIED FRACTURE OF THE LOWER END OF LEFT RADIUS, INITIAL ENCOUNTER FOR CLOSED FRACTURE: ICD-10-CM

## 2022-07-22 DIAGNOSIS — Z90.49 ACQUIRED ABSENCE OF OTHER SPECIFIED PARTS OF DIGESTIVE TRACT: Chronic | ICD-10-CM

## 2022-07-22 DIAGNOSIS — Z96.652 PRESENCE OF LEFT ARTIFICIAL KNEE JOINT: Chronic | ICD-10-CM

## 2022-07-22 DIAGNOSIS — S52.602A UNSPECIFIED FRACTURE OF LOWER END OF LEFT ULNA, INITIAL ENCOUNTER FOR CLOSED FRACTURE: ICD-10-CM

## 2022-07-22 DIAGNOSIS — Z96.642 PRESENCE OF LEFT ARTIFICIAL HIP JOINT: Chronic | ICD-10-CM

## 2022-07-22 PROCEDURE — C1713: CPT

## 2022-07-22 PROCEDURE — 76000 FLUOROSCOPY <1 HR PHYS/QHP: CPT

## 2022-07-22 PROCEDURE — 25609 OPTX DST RD XART FX/EP SEP3+: CPT | Mod: LT

## 2022-07-22 DEVICE — SCREW LOKG SLF-TPNG W/ STARDRIVE RECESS 2.X20MM: Type: IMPLANTABLE DEVICE | Site: LEFT | Status: FUNCTIONAL

## 2022-07-22 DEVICE — SCREW LOKG SLF-TPNG W/ STARDRIVE RECESS 2.X18MM: Type: IMPLANTABLE DEVICE | Site: LEFT | Status: FUNCTIONAL

## 2022-07-22 DEVICE — SCREW LOKG SLF-TPNG W/ STARDRIVE RECESS 2.X14MM: Type: IMPLANTABLE DEVICE | Site: LEFT | Status: FUNCTIONAL

## 2022-07-22 DEVICE — IMPLANTABLE DEVICE: Type: IMPLANTABLE DEVICE | Site: LEFT | Status: FUNCTIONAL

## 2022-07-22 DEVICE — SCREW CORTEX S-T 2.7X16MM: Type: IMPLANTABLE DEVICE | Site: LEFT | Status: FUNCTIONAL

## 2022-07-22 DEVICE — SCREW CORTEX S-T 2.7X14MM: Type: IMPLANTABLE DEVICE | Site: LEFT | Status: FUNCTIONAL

## 2022-07-22 DEVICE — SCREW LOKG SLF-TPNG W/ STARDRIVE RECESS 2.X16MM: Type: IMPLANTABLE DEVICE | Site: LEFT | Status: FUNCTIONAL

## 2022-07-22 DEVICE — WIRE K TRC PT 1.25X150MM: Type: IMPLANTABLE DEVICE | Site: LEFT | Status: FUNCTIONAL

## 2022-07-22 RX ORDER — ONDANSETRON 8 MG/1
4 TABLET, FILM COATED ORAL ONCE
Refills: 0 | Status: DISCONTINUED | OUTPATIENT
Start: 2022-07-22 | End: 2022-07-22

## 2022-07-22 RX ORDER — APREPITANT 80 MG/1
40 CAPSULE ORAL ONCE
Refills: 0 | Status: COMPLETED | OUTPATIENT
Start: 2022-07-22 | End: 2022-07-22

## 2022-07-22 RX ORDER — DENOSUMAB 60 MG/ML
1 INJECTION SUBCUTANEOUS
Qty: 0 | Refills: 0 | DISCHARGE

## 2022-07-22 RX ORDER — ACETAMINOPHEN AND IBUPROFEN 250; 125 MG/1; MG/1
2 TABLET ORAL
Qty: 0 | Refills: 0 | DISCHARGE

## 2022-07-22 RX ORDER — ACETAMINOPHEN 500 MG
2 TABLET ORAL
Qty: 0 | Refills: 0 | DISCHARGE

## 2022-07-22 RX ORDER — CHOLECALCIFEROL (VITAMIN D3) 125 MCG
1 CAPSULE ORAL
Qty: 0 | Refills: 0 | DISCHARGE

## 2022-07-22 RX ORDER — L.ACIDOPH/B.ANIMALIS/B.LONGUM 15B CELL
1 CAPSULE ORAL
Qty: 0 | Refills: 0 | DISCHARGE

## 2022-07-22 RX ORDER — IBUPROFEN 200 MG
1 TABLET ORAL
Qty: 30 | Refills: 0
Start: 2022-07-22

## 2022-07-22 RX ORDER — ATORVASTATIN CALCIUM 80 MG/1
1 TABLET, FILM COATED ORAL
Qty: 0 | Refills: 0 | DISCHARGE

## 2022-07-22 RX ORDER — PRIMIDONE 250 MG/1
2 TABLET ORAL
Qty: 0 | Refills: 0 | DISCHARGE

## 2022-07-22 RX ORDER — HYDROMORPHONE HYDROCHLORIDE 2 MG/ML
0.5 INJECTION INTRAMUSCULAR; INTRAVENOUS; SUBCUTANEOUS
Refills: 0 | Status: DISCONTINUED | OUTPATIENT
Start: 2022-07-22 | End: 2022-07-22

## 2022-07-22 RX ORDER — SODIUM CHLORIDE 9 MG/ML
1000 INJECTION, SOLUTION INTRAVENOUS
Refills: 0 | Status: DISCONTINUED | OUTPATIENT
Start: 2022-07-22 | End: 2022-07-22

## 2022-07-22 RX ORDER — PREGABALIN 225 MG/1
1 CAPSULE ORAL
Qty: 0 | Refills: 0 | DISCHARGE

## 2022-07-22 RX ORDER — BUPROPION HYDROCHLORIDE 150 MG/1
1 TABLET, EXTENDED RELEASE ORAL
Qty: 0 | Refills: 0 | DISCHARGE

## 2022-07-22 RX ORDER — OXYBUTYNIN CHLORIDE 5 MG
1 TABLET ORAL
Qty: 0 | Refills: 0 | DISCHARGE

## 2022-07-22 RX ORDER — LEVOTHYROXINE SODIUM 125 MCG
1 TABLET ORAL
Qty: 0 | Refills: 0 | DISCHARGE

## 2022-07-22 RX ORDER — OXYCODONE HYDROCHLORIDE 5 MG/1
5 TABLET ORAL ONCE
Refills: 0 | Status: DISCONTINUED | OUTPATIENT
Start: 2022-07-22 | End: 2022-07-22

## 2022-07-22 RX ORDER — MIDODRINE HYDROCHLORIDE 2.5 MG/1
1 TABLET ORAL
Qty: 0 | Refills: 0 | DISCHARGE

## 2022-07-22 RX ORDER — ALPRAZOLAM 0.25 MG
1 TABLET ORAL
Qty: 0 | Refills: 0 | DISCHARGE

## 2022-07-22 RX ORDER — CEFAZOLIN SODIUM 1 G
2000 VIAL (EA) INJECTION ONCE
Refills: 0 | Status: COMPLETED | OUTPATIENT
Start: 2022-07-22 | End: 2022-07-22

## 2022-07-22 RX ORDER — SERTRALINE 25 MG/1
150 TABLET, FILM COATED ORAL
Qty: 0 | Refills: 0 | DISCHARGE

## 2022-07-22 RX ORDER — CHLORHEXIDINE GLUCONATE 213 G/1000ML
1 SOLUTION TOPICAL ONCE
Refills: 0 | Status: COMPLETED | OUTPATIENT
Start: 2022-07-22 | End: 2022-07-22

## 2022-07-22 RX ADMIN — APREPITANT 40 MILLIGRAM(S): 80 CAPSULE ORAL at 10:55

## 2022-07-22 RX ADMIN — SODIUM CHLORIDE 75 MILLILITER(S): 9 INJECTION, SOLUTION INTRAVENOUS at 13:41

## 2022-07-22 NOTE — ASU DISCHARGE PLAN (ADULT/PEDIATRIC) - BATHING
cover with waterproof sleeve/Shower only cover with waterproof sleeve/Shower only/Do not submerge in water

## 2022-07-22 NOTE — ASU DISCHARGE PLAN (ADULT/PEDIATRIC) - NS MD DC FALL RISK RISK
For information on Fall & Injury Prevention, visit: [email or web address] OR  [email or web address] OR  https://www.cdc.gov/steadi/patient.html

## 2022-07-22 NOTE — ASU DISCHARGE PLAN (ADULT/PEDIATRIC) - CALL YOUR DOCTOR IF YOU HAVE ANY OF THE FOLLOWING:
Fever greater than (need to indicate Fahrenheit or Celsius) Bleeding that does not stop/Swelling that gets worse/Pain not relieved by Medications/Fever greater than (need to indicate Fahrenheit or Celsius)/Numbness, tingling, color or temperature change to extremity/Increased irritability or sluggishness

## 2022-07-22 NOTE — BRIEF OPERATIVE NOTE - NSICDXBRIEFPROCEDURE_GEN_ALL_CORE_FT
PROCEDURES:  Open reduction and internal fixation (ORIF) of fracture of distal ends of both radius and ulna of  22-Jul-2022 10:58:57  Janes Grande

## 2022-07-22 NOTE — ASU PATIENT PROFILE, ADULT - FALL HARM RISK - HARM RISK INTERVENTIONS

## 2022-07-22 NOTE — ASU PATIENT PROFILE, ADULT - NSICDXPASTSURGICALHX_GEN_ALL_CORE_FT
PAST SURGICAL HISTORY:  H/O blepharoplasty age 67 and age 77    H/O cataract extraction bilateral 2017    History of colon resection perforated diverticulum, had a colostomy for 1 year and then reversed    History of cosmetic surgery "facelift" age 52 with chin and cheek implants    History of left hip replacement 2019    History of left knee replacement 2015    History of open reduction and internal fixation (ORIF) procedure 2014 left femur    History of tonsillectomy and adenoidectomy as a child    Status post Mohs surgery for basal cell carcinoma

## 2022-07-22 NOTE — BRIEF OPERATIVE NOTE - NSICDXBRIEFPOSTOP_GEN_ALL_CORE_FT
POST-OP DIAGNOSIS:  Radius and ulna distal fracture, left, closed, initial encounter 22-Jul-2022 10:58:18  Janes Grande

## 2022-07-22 NOTE — ASU DISCHARGE PLAN (ADULT/PEDIATRIC) - CARE PROVIDER_API CALL
Luke Osborne)  Orthopaedic Surgery  [address]  [place] [address]  Phone: [phone]  Fax: [phone]  Follow Up Time:

## 2022-07-22 NOTE — ASU PATIENT PROFILE, ADULT - NSICDXPASTMEDICALHX_GEN_ALL_CORE_FT
PAST MEDICAL HISTORY:  Anxiety     Balance disorder     Basal cell carcinoma     COVID-19 June 2021 only had mild cold-like symptoms and 6/30/22 treated with monoclonal antibodies, cold like symptoms, no hospitalization    COVID-19 vaccine series completed     Distal radius fracture, left 7/17/22    Easy bruising     History of dehydration     History of depression     History of diverticulitis     Hyperlipidemia     Hypotension     Hypothyroid     Keloid scar     Microscopic hematuria     Multiple falls     Osteoarthritis     Osteoporosis     Overactive bladder     Perforated diverticulum     Urge incontinence     Waldenstrom's disease diagnosed 1996, no treatment

## 2022-07-25 PROBLEM — L91.0 HYPERTROPHIC SCAR: Chronic | Status: ACTIVE | Noted: 2022-07-20

## 2022-07-25 PROBLEM — R26.89 OTHER ABNORMALITIES OF GAIT AND MOBILITY: Chronic | Status: ACTIVE | Noted: 2022-07-20

## 2022-07-25 PROBLEM — U07.1 COVID-19: Chronic | Status: ACTIVE | Noted: 2022-07-20

## 2022-07-25 PROBLEM — N39.41 URGE INCONTINENCE: Chronic | Status: ACTIVE | Noted: 2022-07-20

## 2022-07-25 PROBLEM — Z92.29 PERSONAL HISTORY OF OTHER DRUG THERAPY: Chronic | Status: ACTIVE | Noted: 2022-07-20

## 2022-07-25 PROBLEM — K57.80 DIVERTICULITIS OF INTESTINE, PART UNSPECIFIED, WITH PERFORATION AND ABSCESS WITHOUT BLEEDING: Chronic | Status: ACTIVE | Noted: 2022-07-20

## 2022-07-25 PROBLEM — R31.29 OTHER MICROSCOPIC HEMATURIA: Chronic | Status: ACTIVE | Noted: 2022-07-20

## 2022-07-25 PROBLEM — M19.90 UNSPECIFIED OSTEOARTHRITIS, UNSPECIFIED SITE: Chronic | Status: ACTIVE | Noted: 2022-07-20

## 2022-07-25 PROBLEM — E03.9 HYPOTHYROIDISM, UNSPECIFIED: Chronic | Status: ACTIVE | Noted: 2022-07-20

## 2022-07-25 PROBLEM — F41.9 ANXIETY DISORDER, UNSPECIFIED: Chronic | Status: ACTIVE | Noted: 2022-07-20

## 2022-07-25 PROBLEM — E78.5 HYPERLIPIDEMIA, UNSPECIFIED: Chronic | Status: ACTIVE | Noted: 2022-07-20

## 2022-07-25 PROBLEM — S52.502A UNSPECIFIED FRACTURE OF THE LOWER END OF LEFT RADIUS, INITIAL ENCOUNTER FOR CLOSED FRACTURE: Chronic | Status: ACTIVE | Noted: 2022-07-20

## 2022-07-25 PROBLEM — Z86.59 PERSONAL HISTORY OF OTHER MENTAL AND BEHAVIORAL DISORDERS: Chronic | Status: ACTIVE | Noted: 2022-07-20

## 2022-07-25 PROBLEM — C88.0 WALDENSTROM MACROGLOBULINEMIA: Chronic | Status: ACTIVE | Noted: 2022-07-20

## 2022-07-25 PROBLEM — N32.81 OVERACTIVE BLADDER: Chronic | Status: ACTIVE | Noted: 2022-07-20

## 2022-07-25 PROBLEM — R29.6 REPEATED FALLS: Chronic | Status: ACTIVE | Noted: 2022-07-20

## 2022-07-25 PROBLEM — Z87.19 PERSONAL HISTORY OF OTHER DISEASES OF THE DIGESTIVE SYSTEM: Chronic | Status: ACTIVE | Noted: 2022-07-20

## 2022-07-25 PROBLEM — C44.91 BASAL CELL CARCINOMA OF SKIN, UNSPECIFIED: Chronic | Status: ACTIVE | Noted: 2022-07-20

## 2022-07-25 PROBLEM — Z86.39 PERSONAL HISTORY OF OTHER ENDOCRINE, NUTRITIONAL AND METABOLIC DISEASE: Chronic | Status: ACTIVE | Noted: 2022-07-20

## 2022-07-25 PROBLEM — M81.0 AGE-RELATED OSTEOPOROSIS WITHOUT CURRENT PATHOLOGICAL FRACTURE: Chronic | Status: ACTIVE | Noted: 2022-07-20

## 2022-07-25 PROBLEM — R23.8 OTHER SKIN CHANGES: Chronic | Status: ACTIVE | Noted: 2022-07-20

## 2022-08-01 ENCOUNTER — APPOINTMENT (OUTPATIENT)
Dept: ORTHOPEDIC SURGERY | Facility: CLINIC | Age: 82
End: 2022-08-01

## 2022-08-01 VITALS — BODY MASS INDEX: 25.61 KG/M2 | HEIGHT: 64 IN | WEIGHT: 150 LBS

## 2022-08-01 DIAGNOSIS — S52.502A UNSPECIFIED FRACTURE OF THE LOWER END OF LEFT RADIUS, INITIAL ENCOUNTER FOR CLOSED FRACTURE: ICD-10-CM

## 2022-08-01 PROCEDURE — 99024 POSTOP FOLLOW-UP VISIT: CPT

## 2022-08-01 PROCEDURE — 29075 APPL CST ELBW FNGR SHORT ARM: CPT | Mod: 58,LT

## 2022-08-01 PROCEDURE — 73110 X-RAY EXAM OF WRIST: CPT | Mod: LT

## 2022-08-01 RX ORDER — IBUPROFEN 600 MG/1
600 TABLET, FILM COATED ORAL
Qty: 30 | Refills: 0 | Status: ACTIVE | COMMUNITY
Start: 2022-07-22

## 2022-08-01 RX ORDER — OXYBUTYNIN CHLORIDE 10 MG/1
10 TABLET, EXTENDED RELEASE ORAL
Qty: 30 | Refills: 0 | Status: ACTIVE | COMMUNITY
Start: 2022-07-20

## 2022-08-01 RX ORDER — SERTRALINE HYDROCHLORIDE 100 MG/1
100 TABLET, FILM COATED ORAL
Qty: 90 | Refills: 0 | Status: ACTIVE | COMMUNITY
Start: 2022-06-06

## 2022-08-01 RX ORDER — ATORVASTATIN CALCIUM 20 MG/1
20 TABLET, FILM COATED ORAL
Qty: 90 | Refills: 0 | Status: ACTIVE | COMMUNITY
Start: 2022-03-26

## 2022-08-01 RX ORDER — AMOXICILLIN 500 MG/1
500 CAPSULE ORAL
Qty: 28 | Refills: 0 | Status: ACTIVE | COMMUNITY
Start: 2022-02-25

## 2022-08-01 RX ORDER — TOPIRAMATE 25 MG/1
25 TABLET, FILM COATED ORAL
Qty: 90 | Refills: 0 | Status: ACTIVE | COMMUNITY
Start: 2022-06-06

## 2022-08-01 RX ORDER — LEVOTHYROXINE SODIUM 0.11 MG/1
112 TABLET ORAL
Qty: 90 | Refills: 0 | Status: ACTIVE | COMMUNITY
Start: 2022-03-27

## 2022-08-01 RX ORDER — BUSPIRONE HYDROCHLORIDE 15 MG/1
15 TABLET ORAL
Qty: 180 | Refills: 0 | Status: ACTIVE | COMMUNITY
Start: 2022-01-05

## 2022-08-01 RX ORDER — ATORVASTATIN CALCIUM 10 MG/1
10 TABLET, FILM COATED ORAL
Qty: 90 | Refills: 0 | Status: ACTIVE | COMMUNITY
Start: 2022-03-15

## 2022-08-01 RX ORDER — BUPROPION HYDROCHLORIDE 300 MG/1
300 TABLET, EXTENDED RELEASE ORAL
Qty: 90 | Refills: 0 | Status: ACTIVE | COMMUNITY
Start: 2022-01-05

## 2022-08-01 RX ORDER — PRIMIDONE 50 MG/1
50 TABLET ORAL
Qty: 180 | Refills: 0 | Status: ACTIVE | COMMUNITY
Start: 2021-11-03

## 2022-08-01 RX ORDER — IPRATROPIUM BROMIDE 21 UG/1
0.03 SPRAY NASAL
Qty: 30 | Refills: 0 | Status: ACTIVE | COMMUNITY
Start: 2021-06-14

## 2022-08-01 RX ORDER — MIDODRINE HYDROCHLORIDE 5 MG/1
5 TABLET ORAL
Qty: 270 | Refills: 0 | Status: ACTIVE | COMMUNITY
Start: 2022-04-26

## 2022-08-01 RX ORDER — CEPHALEXIN 500 MG/1
500 CAPSULE ORAL
Qty: 14 | Refills: 0 | Status: ACTIVE | COMMUNITY
Start: 2022-03-14

## 2022-08-01 RX ORDER — TRAMADOL HYDROCHLORIDE 50 MG/1
50 TABLET, COATED ORAL
Qty: 60 | Refills: 0 | Status: ACTIVE | COMMUNITY
Start: 2022-06-20

## 2022-08-01 RX ORDER — OXYCODONE AND ACETAMINOPHEN 5; 325 MG/1; MG/1
5-325 TABLET ORAL
Qty: 10 | Refills: 0 | Status: ACTIVE | COMMUNITY
Start: 2022-07-22

## 2022-08-01 RX ORDER — MELOXICAM 15 MG/1
15 TABLET ORAL
Qty: 30 | Refills: 0 | Status: ACTIVE | COMMUNITY
Start: 2022-02-16

## 2022-08-02 NOTE — END OF VISIT
[FreeTextEntry3] : All medical record entries made by the Scribe were at my,  Dr. Luke Osborne MD., direction and personally dictated by me on 08/01/2022. I have personally reviewed the chart and agree that the record accurately reflects my personal performance of the history, physical exam, assessment and plan.

## 2022-08-02 NOTE — HISTORY OF PRESENT ILLNESS
[de-identified] : s/p ORIF of left distal radius fracture [de-identified] : The patient is a 82 year female who returns for the 1st postoperative visit after undergoing left distal radius fracture at [facility]. The surgery was on 07/22/2022. She reports postoperative pain for which she takes Ibuprofen 600mg. She has complaints of numbness distally to the thumb and index finger.  [de-identified] : Patient is WDWN, alert, and in no acute distress. Breathing is unlabored. She is grossly oriented to person, place, and time.\par \par She is accompanied by her daughter today.\par \par Dissolvable sutures in place, with overlying steri strips.\par Incision site is healing well, without signs of postoperative infection.\par Normal amount of postoperative infection and tenderness are present.\par ROM to the wrist was not accessed\par Numbness noted distally to the thumb and index finger. [de-identified] : AP, lateral and oblique views of the LEFT wrist were obtained today and revealed a distal radius and distal ulna fracture stabilized by Synthes variable-angle distal radius plate with multiple locked 2.4 cortical screws and nonlocked 2.7 cortical screws. [de-identified] : At this time, she was placed into a left short arm cast in the office on 8/1/22.\par Proper cast care instructions were reviewed with the patient.\par She was instructed on ROM exercises of the shoulder, elbow and digits while casted.\par She was encouraged to use the hand for ADLs while casted.\par NSAIDs prn.\par RX: Platform walker\par RX: Ibuprofen 600mg, BID (30 tablets).\par Follow up in 4 weeks for cast removal and repeat xrays.

## 2022-08-02 NOTE — ADDENDUM
[FreeTextEntry1] : I, Marguerite Newell wrote this note acting as a scribe for Dr. Luke Osborne on Aug 01, 2022.

## 2022-08-15 ENCOUNTER — RX RENEWAL (OUTPATIENT)
Age: 82
End: 2022-08-15

## 2022-08-15 RX ORDER — IBUPROFEN 800 MG/1
800 TABLET, FILM COATED ORAL 3 TIMES DAILY
Qty: 60 | Refills: 0 | Status: ACTIVE | COMMUNITY
Start: 2022-08-01 | End: 1900-01-01

## 2022-09-01 ENCOUNTER — APPOINTMENT (OUTPATIENT)
Dept: ORTHOPEDIC SURGERY | Facility: CLINIC | Age: 82
End: 2022-09-01

## 2022-09-01 VITALS — HEIGHT: 65 IN | BODY MASS INDEX: 24.99 KG/M2 | WEIGHT: 150 LBS

## 2022-09-01 DIAGNOSIS — S52.602D UNSPECIFIED FRACTURE OF THE LOWER END OF LEFT RADIUS, SUBSEQUENT ENCOUNTER FOR CLOSED FRACTURE WITH ROUTINE HEALING: ICD-10-CM

## 2022-09-01 DIAGNOSIS — S52.502D UNSPECIFIED FRACTURE OF THE LOWER END OF LEFT RADIUS, SUBSEQUENT ENCOUNTER FOR CLOSED FRACTURE WITH ROUTINE HEALING: ICD-10-CM

## 2022-09-01 PROCEDURE — 99214 OFFICE O/P EST MOD 30 MIN: CPT | Mod: 24,25

## 2022-09-01 PROCEDURE — 73552 X-RAY EXAM OF FEMUR 2/>: CPT | Mod: LT

## 2022-09-01 PROCEDURE — 73110 X-RAY EXAM OF WRIST: CPT | Mod: LT

## 2022-09-01 PROCEDURE — 73562 X-RAY EXAM OF KNEE 3: CPT | Mod: LT

## 2022-09-01 PROCEDURE — 29125 APPL SHORT ARM SPLINT STATIC: CPT | Mod: 58,LT

## 2022-09-01 PROCEDURE — 20610 DRAIN/INJ JOINT/BURSA W/O US: CPT | Mod: 79,RT

## 2022-09-01 RX ORDER — CELECOXIB 200 MG/1
200 CAPSULE ORAL DAILY
Qty: 30 | Refills: 0 | Status: ACTIVE | COMMUNITY
Start: 2022-09-01 | End: 1900-01-01

## 2022-09-01 NOTE — END OF VISIT
[FreeTextEntry3] : All medical record entries made by the Scribe were at my,  Dr. Luke Osborne MD., direction and personally dictated by me on 09/01/2022. I have personally reviewed the chart and agree that the record accurately reflects my personal performance of the history, physical exam, assessment and plan.

## 2022-09-01 NOTE — HISTORY OF PRESENT ILLNESS
[de-identified] : s/p ORIF of left distal radius fracture [de-identified] : The patient is a 82 year female who returns for the 2nd postoperative visit after undergoing left distal radius fracture at [facility]. The surgery was on 07/22/2022. She returns on 9/1/22 for repeat xrays and cast removal. She reports to have continued pain once the cast was removed. She has a great deal of residual stiffness as well.\par \par She additionally complains of right knee pain. She has been treated in the past with both cortisone and gel injections by another physician. She has used Ibuprofen for pain without relief. She would like a cortisone injection today.\par \par She is status post left TKR. She has a prior history of a femur fracture which she underwent  ORIF for. She was treated by another physician who stated there was nothing that could be done other than surgery to shorten the hardware. [de-identified] : Patient is WDWN, alert, and in no acute distress. Breathing is unlabored. She is grossly oriented to person, place, and time.\par \par She is accompanied by her daughter today.\par \par She presents to the office in a left short arm cast which was removed in the office today on 9/1/22.\par Incision site is well healed, without signs of postoperative infection.\par Normal amount of postoperative infection and tenderness are present.\par ROM to the wrist is limited status post immobilization for 6 weeks. \par Numbness noted distally to the thumb and index finger. [de-identified] : AP, lateral and oblique views of the LEFT wrist were obtained today and revealed a distal radius and distal ulna fracture stabilized by Synthes variable-angle distal radius plate with multiple locked 2.4 cortical screws and nonlocked 2.7 cortical screws. The fracture and hardware is well aligned. The fracture is not yet fully healed. \par \par AP and lateral views of the LEFT femur were obtained and revealed a blade and IM nail stabilized a prior fracture \par \par AP, lateral, and skyline views of the LEFT knee were obtained and revealed a knee prosthesis in good position with a healed periprosthetic fracture stabilized by lateral plate and screws. [de-identified] : The left short arm cast was removed in the office today on 9/1/22.\par She was placed into a well molded fiber glass volar splint for support and protection.\par She was instructed to remove the splint intermittently and soak the hand in warm water and Epsom salts and begin flexion/extension exercises of the digits and wrist.\par The patient wishes to proceed with hand therapy. A script was given.	\par Follow up in 4 weeks for repeat xrays. She will follow up before then if need be as she will be leaving for [place] but is unsure of the exact date.\par \par Finally with regard to the right knee, the patient wishes to proceed with a cortisone injection today. Under sterile precautions, an injection of 5cc 1% lidocaine without epinephrine and 0.5cc Kenalog 40mg, 0.5cc Dexamethasone was administered into the . The patient tolerated the procedure well. \par \par With regard to the left knee/femur, I recommended she return to the initial treating physician who performed her surgery.  She asked about further injections to the left knee which I strongly advised against due to the increased risks of infection to the prosthesis. Topical analgesics and NSAIDs prn. \par \par RX: Celebrex 200mg, QHS (30 tablets).

## 2022-09-01 NOTE — ADDENDUM
[FreeTextEntry1] : I, Marguerite Newell wrote this note acting as a scribe for Dr. Luke Osborne on Sep 01, 2022.

## 2022-09-17 NOTE — DISCHARGE NOTE PROVIDER - HOSPITAL COURSE
Hospital Course:  The patient is a 81y year old Female status post mechanical fall and landing on outstretched left hand. Patient presented to [facility] Emergency Department on the date above for evaluation and management. In the ED, the patient was found to have a Gustilo Dinh grade 1 open distal both bone forearm fracture. The patient was started on antibiotics, closed reduced, and splinted. Patient was subsequently admitted for further IV antibiotic coverage. Patient was then deemed stable for discharge and follow up as an outpatient with Dr. Osborne to schedule definitive surgical fixation. awake/alert

## 2022-09-22 ENCOUNTER — APPOINTMENT (OUTPATIENT)
Dept: ORTHOPEDIC SURGERY | Facility: CLINIC | Age: 82
End: 2022-09-22

## 2023-03-21 NOTE — ASU PREOP CHECKLIST - BP NONINVASIVE SYSTOLIC (MM HG)
147 Low Dose Naltrexone Counseling- I discussed with the patient the potential risks and side effects of low dose naltrexone including but not limited to: more vivid dreams, headaches, nausea, vomiting, abdominal pain, fatigue, dizziness, and anxiety.

## 2023-08-23 NOTE — ED PROVIDER NOTE - HIV OFFER
Lyndsay from [facility] called.   Pt is not doing well on land pt they would like an order put in [facility] for aquatic therapy.   Any questions please call her at    Previously Declined (within the last year)

## 2023-10-09 NOTE — ASU DISCHARGE PLAN (ADULT/PEDIATRIC) - NPI NUMBER (FOR SYSADMIN USE ONLY) :
[[id number]] VTAMA Counseling: I discussed with the patient that VTAMA is not for use in the eyes, mouth or mouth. They should call the office if they develop any signs of allergic reactions to VTAMA. The patient verbalized understanding of the proper use and possible adverse effects of VTAMA.  All of the patient's questions and concerns were addressed.

## (undated) DEVICE — GLV 8 ESTEEM BLUE

## (undated) DEVICE — DRSG SLING ARM MED

## (undated) DEVICE — PACK UPPER EXTREMITY

## (undated) DEVICE — DRILL BIT SYNTHES ORTHO QC 2X100MM

## (undated) DEVICE — SUT VICRYL 3-0 27" RB-1 UNDYED

## (undated) DEVICE — DRAPE TOWEL BLUE 17" X 24"

## (undated) DEVICE — WRAP COMPRESSION CALF MED

## (undated) DEVICE — SUT MONOCRYL 4-0 27" PS-2 UNDYED

## (undated) DEVICE — CUFF TOURNIQUET 18" DUAL PORT LUER LOCK

## (undated) DEVICE — POSITIONER BOOT CRADLE

## (undated) DEVICE — DRSG STERISTRIPS 0.5X4"

## (undated) DEVICE — BLANKET WARMER LOWER ADULT

## (undated) DEVICE — DRAPE 3/4 SHEET 52X76"

## (undated) DEVICE — DRAPE C ARM UNIVERSAL

## (undated) DEVICE — DRSG ESMARK 4"

## (undated) DEVICE — DRSG IMMOBILIZER SHOULDER QUICK RELEASE LG

## (undated) DEVICE — DRSG MASTISOL

## (undated) DEVICE — DRILL BIT SYNTHES ORTHO 1.8MM

## (undated) DEVICE — BLANKET WARMER FULL ADULT

## (undated) DEVICE — GLV 7.5 PROTEXIS